# Patient Record
Sex: FEMALE | Race: ASIAN | NOT HISPANIC OR LATINO | URBAN - METROPOLITAN AREA
[De-identification: names, ages, dates, MRNs, and addresses within clinical notes are randomized per-mention and may not be internally consistent; named-entity substitution may affect disease eponyms.]

---

## 2021-02-24 ENCOUNTER — EMERGENCY (EMERGENCY)
Facility: HOSPITAL | Age: 31
LOS: 1 days | Discharge: HOME | End: 2021-02-24
Attending: EMERGENCY MEDICINE | Admitting: EMERGENCY MEDICINE
Payer: COMMERCIAL

## 2021-02-24 VITALS
HEIGHT: 65 IN | HEART RATE: 78 BPM | TEMPERATURE: 98 F | WEIGHT: 134.92 LBS | OXYGEN SATURATION: 98 % | SYSTOLIC BLOOD PRESSURE: 115 MMHG | RESPIRATION RATE: 18 BRPM | DIASTOLIC BLOOD PRESSURE: 78 MMHG

## 2021-02-24 DIAGNOSIS — M54.5 LOW BACK PAIN: ICD-10-CM

## 2021-02-24 DIAGNOSIS — M54.6 PAIN IN THORACIC SPINE: ICD-10-CM

## 2021-02-24 DIAGNOSIS — Z91.018 ALLERGY TO OTHER FOODS: ICD-10-CM

## 2021-02-24 LAB
APPEARANCE UR: CLEAR — SIGNIFICANT CHANGE UP
BACTERIA # UR AUTO: PRESENT /HPF
BILIRUB UR-MCNC: NEGATIVE — SIGNIFICANT CHANGE UP
COLOR SPEC: YELLOW — SIGNIFICANT CHANGE UP
DIFF PNL FLD: ABNORMAL
EPI CELLS # UR: SIGNIFICANT CHANGE UP /HPF (ref 0–5)
GLUCOSE UR QL: NEGATIVE — SIGNIFICANT CHANGE UP
HCG UR QL: NEGATIVE — SIGNIFICANT CHANGE UP
KETONES UR-MCNC: NEGATIVE — SIGNIFICANT CHANGE UP
LEUKOCYTE ESTERASE UR-ACNC: NEGATIVE — SIGNIFICANT CHANGE UP
NITRITE UR-MCNC: NEGATIVE — SIGNIFICANT CHANGE UP
PH UR: 6.5 — SIGNIFICANT CHANGE UP (ref 5–8)
PROT UR-MCNC: ABNORMAL MG/DL
RBC CASTS # UR COMP ASSIST: ABNORMAL /HPF
SP GR SPEC: 1.02 — SIGNIFICANT CHANGE UP (ref 1–1.03)
UROBILINOGEN FLD QL: 0.2 E.U./DL — SIGNIFICANT CHANGE UP
WBC UR QL: < 5 /HPF — SIGNIFICANT CHANGE UP

## 2021-02-24 PROCEDURE — 99284 EMERGENCY DEPT VISIT MOD MDM: CPT

## 2021-02-24 RX ORDER — LIDOCAINE 4 G/100G
1 CREAM TOPICAL
Qty: 10 | Refills: 0
Start: 2021-02-24 | End: 2021-03-05

## 2021-02-24 RX ORDER — ACETAMINOPHEN 500 MG
650 TABLET ORAL ONCE
Refills: 0 | Status: COMPLETED | OUTPATIENT
Start: 2021-02-24 | End: 2021-02-24

## 2021-02-24 RX ORDER — KETOROLAC TROMETHAMINE 30 MG/ML
15 SYRINGE (ML) INJECTION ONCE
Refills: 0 | Status: DISCONTINUED | OUTPATIENT
Start: 2021-02-24 | End: 2021-02-24

## 2021-02-24 RX ORDER — METHOCARBAMOL 500 MG/1
1 TABLET, FILM COATED ORAL
Qty: 14 | Refills: 0
Start: 2021-02-24 | End: 2021-03-02

## 2021-02-24 RX ORDER — LIDOCAINE 4 G/100G
1 CREAM TOPICAL ONCE
Refills: 0 | Status: COMPLETED | OUTPATIENT
Start: 2021-02-24 | End: 2021-02-24

## 2021-02-24 RX ADMIN — Medication 15 MILLIGRAM(S): at 21:54

## 2021-02-24 RX ADMIN — LIDOCAINE 1 PATCH: 4 CREAM TOPICAL at 21:54

## 2021-02-24 RX ADMIN — Medication 650 MILLIGRAM(S): at 21:54

## 2021-02-24 NOTE — ED ADULT NURSE NOTE - PRIMARY CARE PROVIDER
alert c/o generalized body pains and fever since yesterday   no c/o chest pain  or SOB    treated for pneumonia in jan feb and march   hx htn  acid reflux   temp 97.8 in triage
uto

## 2021-02-24 NOTE — ED PROVIDER NOTE - NSPTACCESSSVCSAPPTDETAILS_ED_ALL_ED_FT
For new R paraspinal back pain secondary to strenuous physical activity; may need better and more focused care for better spine health and disc herniation prevention

## 2021-02-24 NOTE — ED PROVIDER NOTE - PATIENT PORTAL LINK FT
You can access the FollowMyHealth Patient Portal offered by [facility] by registering at the following website: [email or web address]. By joining [employer]’s FollowMyHealth portal, you will also be able to view your health information using other applications (apps) compatible with our system.

## 2021-02-24 NOTE — ED PROVIDER NOTE - NSFOLLOWUPINSTRUCTIONS_ED_ALL_ED_FT
Acute Low Back Pain    WHAT YOU NEED TO KNOW:    Acute low back pain is sudden discomfort in your lower back area that lasts for up to 6 weeks. The discomfort makes it difficult to tolerate activity.     DISCHARGE INSTRUCTIONS:    Return to the emergency department if:   •You have severe pain.      •You have sudden stiffness and heaviness on both buttocks down to both legs.      •You have numbness or weakness in one leg, or pain in both legs.      •You have numbness in your genital area or across your lower back.      •You cannot control your urine or bowel movements.      Contact your healthcare provider if:   •You have a fever.      •You have pain at night or when you rest.      •Your pain does not get better with treatment.      •You have pain that worsens when you cough or sneeze.      •You suddenly feel something pop or snap in your back.      •You have questions or concerns about your condition or care.      Medicines: You may need any of the following:   •NSAIDs help decrease swelling and pain. This medicine is available with or without a doctor's order. NSAIDs can cause stomach bleeding or kidney problems in certain people. If you take blood thinner medicine, always ask your healthcare provider if NSAIDs are safe for you. Always read the medicine label and follow directions.      •Acetaminophen decreases pain and fever. It is available without a doctor's order. Ask how much to take and how often to take it. Follow directions. Read the labels of all other medicines you are using to see if they also contain acetaminophen, or ask your doctor or pharmacist. Acetaminophen can cause liver damage if not taken correctly. Do not use more than 4 grams (4,000 milligrams) total of acetaminophen in one day.       •Muscle relaxers decrease pain by relaxing the muscles in your lower spine.      •Prescription pain medicine may be given. Ask your healthcare provider how to take this medicine safely. Some prescription pain medicines contain acetaminophen. Do not take other medicines that contain acetaminophen without talking to your healthcare provider. Too much acetaminophen may cause liver damage. Prescription pain medicine may cause constipation. Ask your healthcare provider how to prevent or treat constipation.       Self-care:   •Stay active as much as you can without causing more pain. Bed rest could make your back pain worse. Start with some light exercises, such as walking. Avoid heavy lifting until your pain is gone. Ask for more information about the activities or exercises that are right for you.       •Apply ice on your back for 15 to 20 minutes every hour or as directed. Use an ice pack, or put crushed ice in a plastic bag. Cover it with a towel before you apply it to your skin. Ice helps prevent tissue damage and decreases swelling and pain.       •Apply heat on your back for 20 to 30 minutes every 2 hours for as many days as directed. Heat helps decrease pain and muscle spasms. Alternate heat and ice.      Prevent acute low back pain:   •Use proper body mechanics. ?Bend at the hips and knees when you  objects. Do not bend from the waist. Use your leg muscles as you lift the load. Do not use your back. Keep the object close to your chest as you lift it. Try not to twist or lift anything above your waist.      ?Change your position often when you stand for long periods of time. Rest one foot on a small box or footrest, and then switch to the other foot often.      ?Try not to sit for long periods of time. When you do, sit in a straight-backed chair with your feet flat on the floor. Never reach, pull, or push while you are sitting.      •Do exercises that strengthen your back muscles. Warm up before you exercise. Ask your healthcare provider the best exercises for you.      •Maintain a healthy weight. Ask your healthcare provider how much you should weigh. Ask him or her to help you create a weight loss plan if you are overweight.      Follow up with your healthcare provider as directed: Return for a follow-up visit if you still have pain after 1 to 3 weeks of treatment. You may need to visit an orthopedist if your back pain lasts longer than 12 weeks. Write down your questions so you remember to ask them during your visits.       © Copyright [employer] 2021           back to top                          © Copyright [employer] 2021

## 2021-02-24 NOTE — ED ADULT TRIAGE NOTE - CHIEF COMPLAINT QUOTE
walk in pt with complaints of left sided back pain x 1 week. Denies flank pain, denies muscle strain or injury. 1000mg tylenol 2 hours ago.

## 2021-02-24 NOTE — ED PROVIDER NOTE - OBJECTIVE STATEMENT
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 Denies any trauma, falls, urinary symptoms, unexplained weight loss, numbness, fever, IVDU, steroid use, or history of cancer. 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 Relieved by stretching and "back cracking" by her . Denies any trauma, falls, urinary symptoms, unexplained weight loss, numbness, fever, IVDU, steroid use, or history of cancer.

## 2021-02-24 NOTE — ED PROVIDER NOTE - CLINICAL SUMMARY MEDICAL DECISION MAKING FREE TEXT BOX
Patient currently appears well, appears nontoxic, is in NAD, and can ambulate with steady, brisk, unassisted gait in the ED. TUNAFISH negative for need for radiographic imaging and patient denies any urinary symptoms or complaints. Symptoms began after strenuous work at [employer] involving cleaning of the floors and relieved after stretching and "back cracking" by her . Otherwise ambulates well on her own with no acute injuries noted, spinal exam normal, no CVA tenderness. Patient advised to rest and cease strenuous physical activity that brought on her symptoms and to see if her symptoms improves afterwards. Also advised to follow up with physical therapy and orthopedics as outpatient for definitive care if needed. Patient currently appears well, appears nontoxic, is in NAD, and can ambulate with steady, brisk, unassisted gait in the ED. TUNAFISH negative for need for radiographic imaging and patient denies any urinary symptoms or complaints. Symptoms began after strenuous work at [employer] involving cleaning of the floors and relieved after stretching and "back cracking" by her . Otherwise ambulates well on her own with no acute injuries noted, spinal exam normal, no CVA tenderness. Patient advised to rest and cease strenuous physical activity that brought on her symptoms and to see if her symptoms improves afterwards. Also advised to follow up with physical therapy and orthopedics as outpatient for definitive care if needed. Blood in urine noted but patient reports she is on her period.

## 2021-02-26 ENCOUNTER — EMERGENCY (EMERGENCY)
Facility: HOSPITAL | Age: 31
LOS: 1 days | Discharge: HOME | End: 2021-02-26
Attending: EMERGENCY MEDICINE | Admitting: EMERGENCY MEDICINE
Payer: COMMERCIAL

## 2021-02-26 VITALS
TEMPERATURE: 98 F | OXYGEN SATURATION: 99 % | DIASTOLIC BLOOD PRESSURE: 75 MMHG | SYSTOLIC BLOOD PRESSURE: 114 MMHG | HEART RATE: 87 BPM | RESPIRATION RATE: 16 BRPM | WEIGHT: 130.07 LBS | HEIGHT: 65 IN

## 2021-02-26 DIAGNOSIS — M54.5 LOW BACK PAIN: ICD-10-CM

## 2021-02-26 LAB
APPEARANCE UR: CLEAR — SIGNIFICANT CHANGE UP
BILIRUB UR-MCNC: NEGATIVE — SIGNIFICANT CHANGE UP
COLOR SPEC: YELLOW — SIGNIFICANT CHANGE UP
DIFF PNL FLD: NEGATIVE — SIGNIFICANT CHANGE UP
GLUCOSE UR QL: NEGATIVE — SIGNIFICANT CHANGE UP
HCG UR QL: NEGATIVE — SIGNIFICANT CHANGE UP
KETONES UR-MCNC: NEGATIVE — SIGNIFICANT CHANGE UP
LEUKOCYTE ESTERASE UR-ACNC: NEGATIVE — SIGNIFICANT CHANGE UP
NITRITE UR-MCNC: NEGATIVE — SIGNIFICANT CHANGE UP
PH UR: 7.5 — SIGNIFICANT CHANGE UP (ref 5–8)
PROT UR-MCNC: NEGATIVE MG/DL — SIGNIFICANT CHANGE UP
SP GR SPEC: 1.02 — SIGNIFICANT CHANGE UP (ref 1–1.03)
UROBILINOGEN FLD QL: 0.2 E.U./DL — SIGNIFICANT CHANGE UP

## 2021-02-26 PROCEDURE — 74176 CT ABD & PELVIS W/O CONTRAST: CPT | Mod: 26

## 2021-02-26 PROCEDURE — 99284 EMERGENCY DEPT VISIT MOD MDM: CPT

## 2021-02-26 RX ORDER — KETOROLAC TROMETHAMINE 30 MG/ML
30 SYRINGE (ML) INJECTION ONCE
Refills: 0 | Status: DISCONTINUED | OUTPATIENT
Start: 2021-02-26 | End: 2021-02-26

## 2021-02-26 RX ORDER — CYCLOBENZAPRINE HYDROCHLORIDE 10 MG/1
1 TABLET, FILM COATED ORAL
Qty: 10 | Refills: 0
Start: 2021-02-26 | End: 2021-03-02

## 2021-02-26 RX ADMIN — Medication 30 MILLIGRAM(S): at 21:44

## 2021-02-26 NOTE — ED ADULT TRIAGE NOTE - CHIEF COMPLAINT QUOTE
left upper back pain x 1 week ago, seen here for same issue- given rx for pain/muscle relaxer pain worsening- denies fever,chills, trauma at this time

## 2021-02-26 NOTE — ED PROVIDER NOTE - PATIENT PORTAL LINK FT
You can access the FollowMyHealth Patient Portal offered by [facility] by registering at the following website: [email or web address]. By joining [employer]’s FollowMyHealth portal, you will also be able to view your health information using other applications (apps) compatible with our system.

## 2021-02-26 NOTE — ED PROVIDER NOTE - INTERNATIONAL TRAVEL
Pt left with out being seen by nurse or md. Searched for pt in assigned area and through halls. Physician called pt with no answer. Checked for pt in waiting room. Md aware/no No

## 2021-02-26 NOTE — ED PROVIDER NOTE - NS ED ROS FT
Constitutional:  No fever, No chills, No night sweats  Eyes:  No visual changes, No discharge, No redness  ENMT:  No epistaxis, no nasal congestion, no throat pain, no difficulty swallowing  CV:  No chest pain, No palpitations, No peripheral edema  Resp:  No cough, No shortness of breath  GI:  No abdominal pain, No vomiting, No diarrhea  MSK:  No neck pain or stiffness, No joint swelling or pain, +back pain  Neuro: no loss of consciousness, no gait abnormality, no headache, no sensory deficits, and no weakness.  Skin:  No abrasions, no lesions, no rashes  Psych:  No known mental health issues

## 2021-02-26 NOTE — ED PROVIDER NOTE - CLINICAL SUMMARY MEDICAL DECISION MAKING FREE TEXT BOX
31yo F no pmh presents with intermittent L flank pain x1wk, only occasionally related to positional changes.  On exam afebrile, VSS, well appearing, no midline spinal TTP, no CVA TTP.  Likely MSK strain; will check CT KUB to eval for stone, UA to eval for hematuria/ UTI, treat pain, monitor, reassess.  No signs or symptoms to suggest cord compression at this time.

## 2021-02-26 NOTE — ED ADULT NURSE NOTE - NSIMPLEMENTINTERV_GEN_ALL_ED
Implemented All Universal Safety Interventions:  Anselmo to call system. Call bell, personal items and telephone within reach. Instruct patient to call for assistance. Room bathroom lighting operational. Non-slip footwear when patient is off stretcher. Physically safe environment: no spills, clutter or unnecessary equipment. Stretcher in lowest position, wheels locked, appropriate side rails in place.

## 2021-02-26 NOTE — ED ADULT NURSE NOTE - OBJECTIVE STATEMENT
30y female presents to ED c/o left flank pain. Pt seen here recently for same complaint, states she has not noticed any improvement in symptoms. Ambulatory. Denies urinary symptoms, PMH. A&ox4.

## 2021-02-26 NOTE — ED PROVIDER NOTE - PHYSICAL EXAMINATION
Constitutional:  Well appearing, awake, alert, oriented to person, place, time/situation and in no apparent distress.  HEENT: Airway patent, Nasal mucosa clear. Mouth with normal mucosa. Throat has no vesicles, no oropharyngeal exudates and uvula is midline.  Eyes: Clear bilaterally, pupils equal, round and reactive to light.  Cardiac: Normal rate, regular rhythm.  Respiratory: Breath sounds clear and equal bilaterally.  GI: Abdomen soft, non-tender, no guarding.  MSK: Spine appears normal, range of motion is not limited, no muscle or joint tenderness  Neuro: Alert and oriented, no focal deficits, no motor or sensory deficits.  Skin: Skin normal color for race, warm, dry and intact. No evidence of rash.

## 2021-02-26 NOTE — ED PROVIDER NOTE - NSFOLLOWUPINSTRUCTIONS_ED_ALL_ED_FT
Back Pain    WHAT YOU NEED TO KNOW:    Back pain is common. You may have back pain and muscle spasms. You may feel sore or stiff on one or both sides of your back. The pain may spread to your lower body. Conditions that affect the spine, joints, or muscles can cause back pain. These may include arthritis, spinal stenosis (narrowing of the spinal column), muscle tension, or breakdown of the spinal discs.    DISCHARGE INSTRUCTIONS:    Call your local emergency number (911 in the ) if:   •You have severe back pain with chest pain.      •You cannot control your urine or bowel movements.      •Your pain becomes so severe that you cannot walk.      Return to the emergency department if:   •You have pain, numbness, or weakness in one or both legs.      •You have severe back pain, nausea, and vomiting.      •You have severe back pain that spreads to your side or genital area.      Call your doctor if:   •You have back pain that does not get better with rest and pain medicine.      •You have a fever.      •You have pain that worsens when you are on your back or when you rest.      •You have pain that worsens when you cough or sneeze.      •You lose weight without trying.      •You have questions or concerns about your condition or care.      Medicines: You may need any of the following:   •NSAIDs, such as ibuprofen, help decrease swelling, pain, and fever. This medicine is available with or without a doctor's order. NSAIDs can cause stomach bleeding or kidney problems in certain people. If you take blood thinner medicine, always ask your healthcare provider if NSAIDs are safe for you. Always read the medicine label and follow directions.      •Acetaminophen decreases pain and fever. It is available without a doctor's order. Ask how much to take and how often to take it. Follow directions. Read the labels of all other medicines you are using to see if they also contain acetaminophen, or ask your doctor or pharmacist. Acetaminophen can cause liver damage if not taken correctly. Do not use more than 4 grams (4,000 milligrams) total of acetaminophen in one day.       •Muscle relaxers help decrease muscle spasms and back pain.      •Prescription pain medicine may be given. Ask your healthcare provider how to take this medicine safely. Some prescription pain medicines contain acetaminophen. Do not take other medicines that contain acetaminophen without talking to your healthcare provider. Too much acetaminophen may cause liver damage. Prescription pain medicine may cause constipation. Ask your healthcare provider how to prevent or treat constipation.       •Take your medicine as directed. Contact your healthcare provider if you think your medicine is not helping or if you have side effects. Tell him or her if you are allergic to any medicine. Keep a list of the medicines, vitamins, and herbs you take. Include the amounts, and when and why you take them. Bring the list or the pill bottles to follow-up visits. Carry your medicine list with you in case of an emergency.      How to manage your back pain:   •Apply ice on your back for 15 to 20 minutes every hour or as directed. Use an ice pack, or put crushed ice in a plastic bag. Cover it with a towel before you apply it to your skin. Ice helps prevent tissue damage and decreases pain.      •Apply heat on your back for 20 to 30 minutes every 2 hours for as many days as directed. Heat helps decrease pain and muscle spasms.      •Stay active as much as you can without causing more pain. Bed rest could make your back pain worse. Avoid heavy lifting until your pain is gone.      •Go to physical therapy as directed. A physical therapist can teach you exercises to help improve movement and strength, and to decrease pain.      Follow up with your doctor in 2 weeks, or as directed: You might need to see a specialist. Write down your questions so you remember to ask them during your visits. Back Pain    WHAT YOU NEED TO KNOW:    Back pain is common. You may have back pain and muscle spasms. You may feel sore or stiff on one or both sides of your back. The pain may spread to your lower body. Conditions that affect the spine, joints, or muscles can cause back pain. These may include arthritis, spinal stenosis (narrowing of the spinal column), muscle tension, or breakdown of the spinal discs.    DISCHARGE INSTRUCTIONS:    Call your local emergency number (911 in the ) if:   •You have severe back pain with chest pain.      •You cannot control your urine or bowel movements.      •Your pain becomes so severe that you cannot walk.      Return to the emergency department if:   •You have pain, numbness, or weakness in one or both legs.      •You have severe back pain, nausea, and vomiting.      •You have severe back pain that spreads to your side or genital area.      Call your doctor if:   •You have back pain that does not get better with rest and pain medicine.      •You have a fever.      •You have pain that worsens when you are on your back or when you rest.      •You have pain that worsens when you cough or sneeze.      •You lose weight without trying.      •You have questions or concerns about your condition or care.      Medicines: You may need any of the following:   •NSAIDs, such as ibuprofen, help decrease swelling, pain, and fever. This medicine is available with or without a doctor's order. NSAIDs can cause stomach bleeding or kidney problems in certain people. If you take blood thinner medicine, always ask your healthcare provider if NSAIDs are safe for you. Always read the medicine label and follow directions.      •Acetaminophen decreases pain and fever. It is available without a doctor's order. Ask how much to take and how often to take it. Follow directions. Read the labels of all other medicines you are using to see if they also contain acetaminophen, or ask your doctor or pharmacist. Acetaminophen can cause liver damage if not taken correctly. Do not use more than 4 grams (4,000 milligrams) total of acetaminophen in one day.       •Muscle relaxers help decrease muscle spasms and back pain.      •Prescription pain medicine may be given. Ask your healthcare provider how to take this medicine safely. Some prescription pain medicines contain acetaminophen. Do not take other medicines that contain acetaminophen without talking to your healthcare provider. Too much acetaminophen may cause liver damage. Prescription pain medicine may cause constipation. Ask your healthcare provider how to prevent or treat constipation.       •Take your medicine as directed. Contact your healthcare provider if you think your medicine is not helping or if you have side effects. Tell him or her if you are allergic to any medicine. Keep a list of the medicines, vitamins, and herbs you take. Include the amounts, and when and why you take them. Bring the list or the pill bottles to follow-up visits. Carry your medicine list with you in case of an emergency.      How to manage your back pain:   •Apply ice on your back for 15 to 20 minutes every hour or as directed. Use an ice pack, or put crushed ice in a plastic bag. Cover it with a towel before you apply it to your skin. Ice helps prevent tissue damage and decreases pain.      •Apply heat on your back for 20 to 30 minutes every 2 hours for as many days as directed. Heat helps decrease pain and muscle spasms.      •Stay active as much as you can without causing more pain. Bed rest could make your back pain worse. Avoid heavy lifting until your pain is gone.      •Go to physical therapy as directed. A physical therapist can teach you exercises to help improve movement and strength, and to decrease pain.      Follow up with your doctor in 2 weeks, or as directed: You might need to see a specialist. Write down your questions so you remember to ask them during your visits.        Lower Back Exercises    AMBULATORY CARE:    Lower back exercises help heal and strengthen your back muscles to prevent another injury. Ask your healthcare provider if you need to see a physical therapist for more advanced exercises.    Seek care immediately if:   •You have severe pain that prevents you from moving.           Contact your healthcare provider if:   •Your pain becomes worse.      •You have new pain.      •You have questions or concerns about your condition or care.      Do lower back exercises safely:   •Do the exercises on a mat or firm surface (not on a bed) to support your spine and prevent low back pain.       •Move slowly and smoothly. Avoid fast or jerky motions.       •Breathe normally. Do not hold your breath.       •Stop if you feel pain. It is normal to feel some discomfort at first. Regular exercise will help decrease your discomfort over time.      Lower back exercises: Your healthcare provider may recommend that you do back exercises 10 to 30 minutes each day. He may also recommend that you do exercises 1 to 3 times each day. Ask your healthcare provider which exercises are best for you and how often to do them.  •Ankle pumps: Lie on your back. Move your foot up (with your toes pointing toward your head). Then, move your foot down (with your toes pointing away from you). Repeat this exercise 10 times on each side.  Ankle Pumps           •Heel slides: Lie on your back. Slowly bend one leg and then straighten it. Next, bend the other leg and then straighten it. Repeat 10 times on each side.   Heel Slides           •Pelvic tilt: Lie on your back with your knees bent and feet flat on the floor. Place your arms in a relaxed position beside your body. Tighten the muscles of your abdomen and flatten your back against the floor. Hold for 5 seconds. Repeat 5 times.  Pelvic Tilt           •Back stretch: Lie on your back with your hands behind your head. Bend your knees and turn the lower half of your body to one side. Hold this position for 10 seconds. Repeat 3 times on each side.   Back Stretch           •Straight leg raises: Lie on your back with one leg straight. Bend the other knee. Tighten your abdomen and then slowly lift the straight leg up about 6 to 12 inches off the floor. Hold for 1 to 5 seconds. Lower your leg slowly. Repeat 10 times on each leg.  Straight Leg Raises           •Knee-to-chest: Lie on your back with your knees bent and feet flat on the floor. Pull one of your knees toward your chest and hold it there for 5 seconds. Return your leg to the starting position. Lift the other knee toward your chest and hold for 5 seconds. Do this 5 times on each side.   Knee to Chest           •Cat and camel: Place your hands and knees on the floor. Arch your back upward toward the ceiling and lower your head. Round out your spine as much as you can. Hold for 5 seconds. Lift your head upward and push your chest downward toward the floor. Hold for 5 seconds. Do 3 sets or as directed.  Cat and Camel Exercise           •Wall squats: Stand with your back against a wall. Tighten the muscles of your abdomen. Slowly lower your body until your knees are bent at a 45 degree angle. Hold this position for 5 seconds. Slowly move back up to a standing position. Repeat 10 times.  STANDING HALF SQUATS           •Curl up: Lie on your back with your knees bent and feet flat on the floor. Place your hands, palms down, underneath the curve in your lower back. Next, with your elbows on the floor, lift your shoulders and chest 2 to 3 inches. Keep your head in line with your shoulders. Hold this position for 5 seconds. When you can do this exercise without pain for 10 to 15 seconds, you may add a rotation. While your shoulders and chest are lifted off the ground, turn slightly to the left and hold. Repeat on the other side.  Abdominal Curl Up           •Bird dog: Place your hands and knees on the floor. Keep your wrists directly below your shoulders and your knees directly below your hips. Pull your belly button in toward your spine. Do not flatten or arch your back. Tighten your abdominal muscles. Raise one arm straight out so that it is aligned with your head. Next, raise the leg opposite your arm. Hold this position for 15 seconds. Lower your arm and leg slowly and change sides. Do 5 sets.   Bird Dog Exercise

## 2021-02-26 NOTE — ED PROVIDER NOTE - OBJECTIVE STATEMENT
29yo F no pmh presents with worsening intermittent nonradiating L flank pain x1 week, worse with walking and partially relieved by lying down.  Seen here 2d ago for same, noted to have hematuria, but was on her period at the time.  Was given analgesia for MSK back pain, dc'ed w analgesia and muscle relaxants.  Pt states she has been taking appropriate analgesia without relief.  No trauma to back.  No leg weakness.  No bowel or bladder dysfunction.  No abd pain.  No hx of kidney stone.  No dysuria or hematuria.  No fever, chills, nausea, vomiting, or diarrhea.

## 2021-02-26 NOTE — ED PROVIDER NOTE - PROGRESS NOTE DETAILS
CT negative for stone.  UA negative.  Pt feels better, wants to go home.  States she took some of her father in law's flexeril which was helpful.  Requesting flexeril rx.  Will give short course flexeril.  Return precautions discussed.  Stable for dc.

## 2021-02-27 PROBLEM — Z78.9 OTHER SPECIFIED HEALTH STATUS: Chronic | Status: ACTIVE | Noted: 2021-02-24

## 2022-08-23 PROBLEM — Z00.00 ENCOUNTER FOR PREVENTIVE HEALTH EXAMINATION: Status: ACTIVE | Noted: 2022-08-23

## 2022-08-29 ENCOUNTER — APPOINTMENT (OUTPATIENT)
Dept: OBGYN | Facility: CLINIC | Age: 32
End: 2022-08-29

## 2022-12-31 ENCOUNTER — NON-APPOINTMENT (OUTPATIENT)
Age: 32
End: 2022-12-31

## 2023-01-10 ENCOUNTER — APPOINTMENT (OUTPATIENT)
Dept: DERMATOLOGY | Facility: CLINIC | Age: 33
End: 2023-01-10

## 2023-01-27 ENCOUNTER — APPOINTMENT (OUTPATIENT)
Dept: DERMATOLOGY | Facility: CLINIC | Age: 33
End: 2023-01-27
Payer: COMMERCIAL

## 2023-01-27 DIAGNOSIS — L30.1 DYSHIDROSIS [POMPHOLYX]: ICD-10-CM

## 2023-01-27 PROCEDURE — 99204 OFFICE O/P NEW MOD 45 MIN: CPT

## 2023-01-27 RX ORDER — HALOBETASOL PROPIONATE 0.5 MG/G
0.05 OINTMENT TOPICAL
Qty: 1 | Refills: 1 | Status: ACTIVE | COMMUNITY
Start: 2023-01-27 | End: 1900-01-01

## 2023-04-14 ENCOUNTER — APPOINTMENT (OUTPATIENT)
Dept: DERMATOLOGY | Facility: CLINIC | Age: 33
End: 2023-04-14

## 2023-05-16 ENCOUNTER — APPOINTMENT (OUTPATIENT)
Dept: DERMATOLOGY | Facility: CLINIC | Age: 33
End: 2023-05-16
Payer: COMMERCIAL

## 2023-05-16 DIAGNOSIS — L98.9 DISORDER OF THE SKIN AND SUBCUTANEOUS TISSUE, UNSPECIFIED: ICD-10-CM

## 2023-05-16 DIAGNOSIS — L72.9 FOLLICULAR CYST OF THE SKIN AND SUBCUTANEOUS TISSUE, UNSPECIFIED: ICD-10-CM

## 2023-05-16 PROCEDURE — 99214 OFFICE O/P EST MOD 30 MIN: CPT

## 2023-05-16 RX ORDER — MUPIROCIN 20 MG/G
2 OINTMENT TOPICAL
Qty: 1 | Refills: 1 | Status: ACTIVE | COMMUNITY
Start: 2023-05-16 | End: 1900-01-01

## 2023-09-11 ENCOUNTER — OUTPATIENT (OUTPATIENT)
Dept: INPATIENT UNIT | Facility: HOSPITAL | Age: 33
LOS: 1 days | Discharge: HOME | End: 2023-09-11
Payer: COMMERCIAL

## 2023-09-11 VITALS
HEART RATE: 100 BPM | SYSTOLIC BLOOD PRESSURE: 100 MMHG | DIASTOLIC BLOOD PRESSURE: 69 MMHG | RESPIRATION RATE: 16 BRPM | TEMPERATURE: 98 F

## 2023-09-11 VITALS — DIASTOLIC BLOOD PRESSURE: 68 MMHG | SYSTOLIC BLOOD PRESSURE: 114 MMHG | HEART RATE: 88 BPM

## 2023-09-11 DIAGNOSIS — Z98.890 OTHER SPECIFIED POSTPROCEDURAL STATES: Chronic | ICD-10-CM

## 2023-09-11 DIAGNOSIS — O26.899 OTHER SPECIFIED PREGNANCY RELATED CONDITIONS, UNSPECIFIED TRIMESTER: ICD-10-CM

## 2023-09-11 PROCEDURE — 59025 FETAL NON-STRESS TEST: CPT | Mod: 26

## 2023-09-11 PROCEDURE — 99221 1ST HOSP IP/OBS SF/LOW 40: CPT | Mod: 25

## 2023-09-11 NOTE — OB PROVIDER TRIAGE NOTE - NSOBPROVIDERNOTE_OBGYN_ALL_OB_FT
32y  at 38w6d, no evidence of bleeding at present  No evidence of labor at this  NST done reactive  BPP done   D/w Dr Roque  Discharge home with instructions  labor precautions  Pt to monitor fetal kick counts  Pt to follow up with OB as scheduled  Pt to return to triage if symptoms worsened: heavy vaginal bleeding, strong uterine contractions, LOF or decreased fetal movements.  - Patient discharged at 20:15

## 2023-09-11 NOTE — OB PROVIDER TRIAGE NOTE - PLAN OF CARE
D/w Dr Roque  Discharge home with instructions  labor precautions  Pt to monitor fetal kick counts  Pt to follow up with OB as scheduled  Pt to return to triage if symptoms worsened: heavy vaginal bleeding, strong uterine contractions, LOF or decreased fetal movements.

## 2023-09-11 NOTE — OB PROVIDER TRIAGE NOTE - NSHPPHYSICALEXAM_GEN_ALL_CORE
T(C): 36.9 (09-11-23 @ 19:24), Max: 36.9 (09-11-23 @ 16:15)  HR: 88 (09-11-23 @ 20:12) (88 - 100)  BP: 107/69 (09-11-23 @ 19:26) (100/69 - 107/69)  RR: 16 (09-11-23 @ 16:15) (16 - 16)    Heart: RRR  Lungs: CTA  Abdomen: Gravid, soft, NT    NST: Reactive with moderate variability  [place]: No contractions  VE: long/closed/post/high; no blood seen.  TAS: SLIUP, cephalic, Anterior placenta, YASMIN: 11.58, BPP 8/8    Images saved in [facility]

## 2023-09-11 NOTE — OB RN TRIAGE NOTE - NS_OBACUITY_OBGYN_ALL_OB_DT
Dotony Mac !     Pat Dubose, DO
Jyothi Perales is calling to inform you that Alexis Prabhakar is not compliant with medications. They have called her with no answer and no voice mail.
11-Sep-2023 14:02

## 2023-09-11 NOTE — OB PROVIDER TRIAGE NOTE - HISTORY OF PRESENT ILLNESS
32y  at 38w6d presents to triage c/o spotting once today when she wiped after urinating.  Reports +FM, no vaginal bleeding, no ROM or LOF  Prenatal care: Dr López, denies any prenatal complications 32y  at 38w6d presents to triage c/o spotting once today when she wiped after urinating.  Reports +FM, no vaginal bleeding, no ROM or LOF  Prenatal care: Dr López, denies any prenatal complications

## 2023-09-13 DIAGNOSIS — O26.853 SPOTTING COMPLICATING PREGNANCY, THIRD TRIMESTER: ICD-10-CM

## 2023-09-13 DIAGNOSIS — Z3A.38 38 WEEKS GESTATION OF PREGNANCY: ICD-10-CM

## 2023-09-22 ENCOUNTER — INPATIENT (INPATIENT)
Facility: HOSPITAL | Age: 33
LOS: 2 days | Discharge: HOME | End: 2023-09-25
Attending: SPECIALIST | Admitting: SPECIALIST

## 2023-09-22 DIAGNOSIS — Z98.890 OTHER SPECIFIED POSTPROCEDURAL STATES: Chronic | ICD-10-CM

## 2023-09-22 DIAGNOSIS — O26.619 LIVER AND BILIARY TRACT DISORDERS IN PREGNANCY, UNSPECIFIED TRIMESTER: ICD-10-CM

## 2023-09-22 LAB
BASOPHILS # BLD AUTO: 0.02 K/UL — SIGNIFICANT CHANGE UP (ref 0–0.2)
BASOPHILS NFR BLD AUTO: 0.2 % — SIGNIFICANT CHANGE UP (ref 0–2)
BLD GP AB SCN SERPL QL: NEGATIVE — SIGNIFICANT CHANGE UP
EOSINOPHIL # BLD AUTO: 0.24 K/UL — SIGNIFICANT CHANGE UP (ref 0–0.5)
EOSINOPHIL NFR BLD AUTO: 2.5 % — SIGNIFICANT CHANGE UP (ref 0–6)
HCT VFR BLD CALC: 38.5 % — SIGNIFICANT CHANGE UP (ref 34.5–45)
HGB BLD-MCNC: 12.8 G/DL — SIGNIFICANT CHANGE UP (ref 11.5–15.5)
IANC: 6.95 K/UL — SIGNIFICANT CHANGE UP (ref 1.8–7.4)
IMM GRANULOCYTES NFR BLD AUTO: 0.5 % — SIGNIFICANT CHANGE UP (ref 0–0.9)
LYMPHOCYTES # BLD AUTO: 1.69 K/UL — SIGNIFICANT CHANGE UP (ref 1–3.3)
LYMPHOCYTES # BLD AUTO: 17.7 % — SIGNIFICANT CHANGE UP (ref 13–44)
MCHC RBC-ENTMCNC: 26.7 PG — LOW (ref 27–34)
MCHC RBC-ENTMCNC: 33.2 GM/DL — SIGNIFICANT CHANGE UP (ref 32–36)
MCV RBC AUTO: 80.2 FL — SIGNIFICANT CHANGE UP (ref 80–100)
MONOCYTES # BLD AUTO: 0.62 K/UL — SIGNIFICANT CHANGE UP (ref 0–0.9)
MONOCYTES NFR BLD AUTO: 6.5 % — SIGNIFICANT CHANGE UP (ref 2–14)
NEUTROPHILS # BLD AUTO: 6.95 K/UL — SIGNIFICANT CHANGE UP (ref 1.8–7.4)
NEUTROPHILS NFR BLD AUTO: 72.6 % — SIGNIFICANT CHANGE UP (ref 43–77)
NRBC # BLD: 0 /100 WBCS — SIGNIFICANT CHANGE UP (ref 0–0)
NRBC # FLD: 0 K/UL — SIGNIFICANT CHANGE UP (ref 0–0)
PLATELET # BLD AUTO: 129 K/UL — LOW (ref 150–400)
RBC # BLD: 4.8 M/UL — SIGNIFICANT CHANGE UP (ref 3.8–5.2)
RBC # FLD: 14.2 % — SIGNIFICANT CHANGE UP (ref 10.3–14.5)
RH IG SCN BLD-IMP: POSITIVE — SIGNIFICANT CHANGE UP
RH IG SCN BLD-IMP: POSITIVE — SIGNIFICANT CHANGE UP
T PALLIDUM AB TITR SER: NEGATIVE — SIGNIFICANT CHANGE UP
WBC # BLD: 9.57 K/UL — SIGNIFICANT CHANGE UP (ref 3.8–10.5)
WBC # FLD AUTO: 9.57 K/UL — SIGNIFICANT CHANGE UP (ref 3.8–10.5)

## 2023-09-22 RX ORDER — SODIUM CHLORIDE 9 MG/ML
1000 INJECTION, SOLUTION INTRAVENOUS ONCE
Refills: 0 | Status: DISCONTINUED | OUTPATIENT
Start: 2023-09-22 | End: 2023-09-24

## 2023-09-22 RX ORDER — SODIUM CHLORIDE 9 MG/ML
500 INJECTION, SOLUTION INTRAVENOUS ONCE
Refills: 0 | Status: DISCONTINUED | OUTPATIENT
Start: 2023-09-22 | End: 2023-09-24

## 2023-09-22 RX ORDER — SODIUM CHLORIDE 9 MG/ML
1000 INJECTION, SOLUTION INTRAVENOUS
Refills: 0 | Status: DISCONTINUED | OUTPATIENT
Start: 2023-09-22 | End: 2023-09-24

## 2023-09-22 RX ORDER — INFLUENZA VIRUS VACCINE 15; 15; 15; 15 UG/.5ML; UG/.5ML; UG/.5ML; UG/.5ML
0.5 SUSPENSION INTRAMUSCULAR ONCE
Refills: 0 | Status: DISCONTINUED | OUTPATIENT
Start: 2023-09-22 | End: 2023-09-25

## 2023-09-22 RX ORDER — CITRIC ACID/SODIUM CITRATE 300-500 MG
15 SOLUTION, ORAL ORAL EVERY 6 HOURS
Refills: 0 | Status: DISCONTINUED | OUTPATIENT
Start: 2023-09-22 | End: 2023-09-24

## 2023-09-22 RX ORDER — OXYTOCIN 10 UNIT/ML
333.33 VIAL (ML) INJECTION
Qty: 20 | Refills: 0 | Status: COMPLETED | OUTPATIENT
Start: 2023-09-22 | End: 2023-09-22

## 2023-09-22 RX ORDER — CHLORHEXIDINE GLUCONATE 213 G/1000ML
1 SOLUTION TOPICAL DAILY
Refills: 0 | Status: DISCONTINUED | OUTPATIENT
Start: 2023-09-22 | End: 2023-09-24

## 2023-09-22 RX ADMIN — SODIUM CHLORIDE 125 MILLILITER(S): 9 INJECTION, SOLUTION INTRAVENOUS at 12:20

## 2023-09-22 RX ADMIN — CHLORHEXIDINE GLUCONATE 1 APPLICATION(S): 213 SOLUTION TOPICAL at 13:14

## 2023-09-22 RX ADMIN — SODIUM CHLORIDE 125 MILLILITER(S): 9 INJECTION, SOLUTION INTRAVENOUS at 22:46

## 2023-09-22 NOTE — OB RN PATIENT PROFILE - NS PRO DEPRESSION SCREENING Y/N6
From: Yi Mari  To: Jose Maria Alfaro  Sent: 2/4/2021 12:48 PM CST  Subject: Medication Question    my insurance needs a pre auterization for the vyvance. if i dont take it i get really sleepy and its for me to do anything  
no

## 2023-09-22 NOTE — OB PROVIDER H&P - HISTORY OF PRESENT ILLNESS
R1 Admission H&P    Subjective  HPI: 32y  @40w3d presents for elective induction. Per patient initial concern for ICP as patient was having full body itches and patient was scheduled for induction since she was >40w, however bile acids were drawn and 3.7.     +FM. -LOF. -CTXs. -VB. Pt denies any other concerns.    – PNC: Denies prenatal issues. GBS neg. EFW 4000g by sono on  per patient.  – OBHx: Primigravid  – GynHx: denies fibroids, cysts, endometriosis, abnormal pap smears, STIs  – PMH: denies  – PSH: polypectomy ()  – Psych: denies   – Social: denies   – Meds: PNV   – Allergies: NKDA  – Will accept blood transfusions? Yes

## 2023-09-22 NOTE — OB PROVIDER H&P - ASSESSMENT
Assessment   32y  @40w3d presents for elective induction.    Plan  1. Admit to L+D. Routine Labs. IVF.  2. IOL w/ buccal cytotec and cervical balloon  3. Fetus: cat 1 tracing. VTX. EFW 4000g by sono. Continuous EFM/[place]  4. Prenatal issues: none  5. GBS negative  6. Pain: IV pain meds/epidural PRN    Plan per attending physician, Dr. Maribel Bauman PGY-1

## 2023-09-22 NOTE — OB RN PATIENT PROFILE - FALL HARM RISK - UNIVERSAL INTERVENTIONS
Bed in lowest position, wheels locked, appropriate side rails in place/Call bell, personal items and telephone in reach/Instruct patient to call for assistance before getting out of bed or chair/Non-slip footwear when patient is out of bed/[place] to call system/Physically safe environment - no spills, clutter or unnecessary equipment/Purposeful Proactive Rounding/Room/bathroom lighting operational, light cord in reach

## 2023-09-22 NOTE — OB PROVIDER H&P - NSHPPHYSICALEXAM_GEN_ALL_CORE
Objective  – VS  T(C): 36.7 (09-22-23 @ 11:15)  HR: 78 (09-22-23 @ 11:41)  BP: 112/63 (09-22-23 @ 11:41)  RR: 16 (09-22-23 @ 11:15)  SpO2: 97% (09-22-23 @ 11:15)    Physical Exam  CV: RRR  Pulm: breathing comfortably on RA  Abd: gravid, nontender  Extr: moving all extremities with ease  – VE: 0/0/-3  – FHT: baseline 130, mod variability, +accels, -decels  – [place]: uterine irritability  – EFW: 4000g by sono  – Sono: vertex

## 2023-09-22 NOTE — OB PROVIDER H&P - NSLOWPPHRISK_OBGYN_A_OB
No previous uterine incision/Davies Pregnancy/Less than or equal to 4 previous vaginal births/No known bleeding disorder/No history of postpartum hemorrhage

## 2023-09-23 LAB
HCT VFR BLD CALC: 38 % — SIGNIFICANT CHANGE UP (ref 34.5–45)
HGB BLD-MCNC: 12.3 G/DL — SIGNIFICANT CHANGE UP (ref 11.5–15.5)
MCHC RBC-ENTMCNC: 26.3 PG — LOW (ref 27–34)
MCHC RBC-ENTMCNC: 32.4 GM/DL — SIGNIFICANT CHANGE UP (ref 32–36)
MCV RBC AUTO: 81.4 FL — SIGNIFICANT CHANGE UP (ref 80–100)
NRBC # BLD: 0 /100 WBCS — SIGNIFICANT CHANGE UP (ref 0–0)
NRBC # FLD: 0 K/UL — SIGNIFICANT CHANGE UP (ref 0–0)
PLATELET # BLD AUTO: 120 K/UL — LOW (ref 150–400)
RBC # BLD: 4.67 M/UL — SIGNIFICANT CHANGE UP (ref 3.8–5.2)
RBC # FLD: 14.5 % — SIGNIFICANT CHANGE UP (ref 10.3–14.5)
WBC # BLD: 9.98 K/UL — SIGNIFICANT CHANGE UP (ref 3.8–10.5)
WBC # FLD AUTO: 9.98 K/UL — SIGNIFICANT CHANGE UP (ref 3.8–10.5)

## 2023-09-23 RX ORDER — OXYTOCIN 10 UNIT/ML
2 VIAL (ML) INJECTION
Qty: 30 | Refills: 0 | Status: DISCONTINUED | OUTPATIENT
Start: 2023-09-23 | End: 2023-09-24

## 2023-09-23 RX ADMIN — Medication 2 MILLIUNIT(S)/MIN: at 20:05

## 2023-09-23 RX ADMIN — CHLORHEXIDINE GLUCONATE 1 APPLICATION(S): 213 SOLUTION TOPICAL at 11:07

## 2023-09-23 RX ADMIN — SODIUM CHLORIDE 125 MILLILITER(S): 9 INJECTION, SOLUTION INTRAVENOUS at 09:00

## 2023-09-23 NOTE — CHART NOTE - NSCHARTNOTEFT_GEN_A_CORE
Patient for rr-IOL.   No complaints.   EFM- Reactive, good variability.   Contractions irregular.  PE: 4 cm floppy & posterior/70/-3  Vaginal Cytotec placed.   D/W Patient, all questions answered.   Plan : Continue with present management.

## 2023-09-24 ENCOUNTER — TRANSCRIPTION ENCOUNTER (OUTPATIENT)
Age: 33
End: 2023-09-24

## 2023-09-24 RX ORDER — OXYCODONE HYDROCHLORIDE 5 MG/1
5 TABLET ORAL
Refills: 0 | Status: DISCONTINUED | OUTPATIENT
Start: 2023-09-24 | End: 2023-09-25

## 2023-09-24 RX ORDER — KETOROLAC TROMETHAMINE 30 MG/ML
30 SYRINGE (ML) INJECTION ONCE
Refills: 0 | Status: DISCONTINUED | OUTPATIENT
Start: 2023-09-24 | End: 2023-09-25

## 2023-09-24 RX ORDER — DIPHENHYDRAMINE HCL 50 MG
25 CAPSULE ORAL EVERY 6 HOURS
Refills: 0 | Status: DISCONTINUED | OUTPATIENT
Start: 2023-09-24 | End: 2023-09-25

## 2023-09-24 RX ORDER — AER TRAVELER 0.5 G/1
1 SOLUTION RECTAL; TOPICAL EVERY 4 HOURS
Refills: 0 | Status: DISCONTINUED | OUTPATIENT
Start: 2023-09-24 | End: 2023-09-25

## 2023-09-24 RX ORDER — IBUPROFEN 200 MG
600 TABLET ORAL EVERY 6 HOURS
Refills: 0 | Status: DISCONTINUED | OUTPATIENT
Start: 2023-09-24 | End: 2023-09-25

## 2023-09-24 RX ORDER — OXYCODONE HYDROCHLORIDE 5 MG/1
5 TABLET ORAL ONCE
Refills: 0 | Status: DISCONTINUED | OUTPATIENT
Start: 2023-09-24 | End: 2023-09-25

## 2023-09-24 RX ORDER — LANOLIN
1 OINTMENT (GRAM) TOPICAL EVERY 6 HOURS
Refills: 0 | Status: DISCONTINUED | OUTPATIENT
Start: 2023-09-24 | End: 2023-09-25

## 2023-09-24 RX ORDER — ACETAMINOPHEN 500 MG
975 TABLET ORAL
Refills: 0 | Status: DISCONTINUED | OUTPATIENT
Start: 2023-09-24 | End: 2023-09-25

## 2023-09-24 RX ORDER — DIBUCAINE 1 %
1 OINTMENT (GRAM) RECTAL EVERY 6 HOURS
Refills: 0 | Status: DISCONTINUED | OUTPATIENT
Start: 2023-09-24 | End: 2023-09-25

## 2023-09-24 RX ORDER — MAGNESIUM HYDROXIDE 400 MG/1
30 TABLET, CHEWABLE ORAL
Refills: 0 | Status: DISCONTINUED | OUTPATIENT
Start: 2023-09-24 | End: 2023-09-25

## 2023-09-24 RX ORDER — HYDROCORTISONE 1 %
1 OINTMENT (GRAM) TOPICAL EVERY 6 HOURS
Refills: 0 | Status: DISCONTINUED | OUTPATIENT
Start: 2023-09-24 | End: 2023-09-25

## 2023-09-24 RX ORDER — TETANUS TOXOID, REDUCED DIPHTHERIA TOXOID AND ACELLULAR PERTUSSIS VACCINE, ADSORBED 5; 2.5; 8; 8; 2.5 [IU]/.5ML; [IU]/.5ML; UG/.5ML; UG/.5ML; UG/.5ML
0.5 SUSPENSION INTRAMUSCULAR ONCE
Refills: 0 | Status: DISCONTINUED | OUTPATIENT
Start: 2023-09-24 | End: 2023-09-25

## 2023-09-24 RX ORDER — BENZOCAINE 10 %
1 GEL (GRAM) MUCOUS MEMBRANE EVERY 6 HOURS
Refills: 0 | Status: DISCONTINUED | OUTPATIENT
Start: 2023-09-24 | End: 2023-09-25

## 2023-09-24 RX ORDER — SIMETHICONE 80 MG/1
80 TABLET, CHEWABLE ORAL EVERY 4 HOURS
Refills: 0 | Status: DISCONTINUED | OUTPATIENT
Start: 2023-09-24 | End: 2023-09-25

## 2023-09-24 RX ORDER — IBUPROFEN 200 MG
600 TABLET ORAL EVERY 6 HOURS
Refills: 0 | Status: COMPLETED | OUTPATIENT
Start: 2023-09-24 | End: 2024-08-22

## 2023-09-24 RX ORDER — PRAMOXINE HYDROCHLORIDE 150 MG/15G
1 AEROSOL, FOAM RECTAL EVERY 4 HOURS
Refills: 0 | Status: DISCONTINUED | OUTPATIENT
Start: 2023-09-24 | End: 2023-09-25

## 2023-09-24 RX ORDER — SODIUM CHLORIDE 9 MG/ML
3 INJECTION INTRAMUSCULAR; INTRAVENOUS; SUBCUTANEOUS EVERY 8 HOURS
Refills: 0 | Status: DISCONTINUED | OUTPATIENT
Start: 2023-09-24 | End: 2023-09-25

## 2023-09-24 RX ADMIN — Medication 1000 MILLIUNIT(S)/MIN: at 10:58

## 2023-09-24 RX ADMIN — Medication 600 MILLIGRAM(S): at 23:48

## 2023-09-24 RX ADMIN — Medication 975 MILLIGRAM(S): at 15:23

## 2023-09-24 RX ADMIN — Medication 975 MILLIGRAM(S): at 20:59

## 2023-09-24 RX ADMIN — Medication 975 MILLIGRAM(S): at 16:50

## 2023-09-24 RX ADMIN — SODIUM CHLORIDE 3 MILLILITER(S): 9 INJECTION INTRAMUSCULAR; INTRAVENOUS; SUBCUTANEOUS at 23:00

## 2023-09-24 RX ADMIN — Medication 600 MILLIGRAM(S): at 17:24

## 2023-09-24 RX ADMIN — Medication 975 MILLIGRAM(S): at 21:45

## 2023-09-24 RX ADMIN — Medication 600 MILLIGRAM(S): at 18:15

## 2023-09-24 RX ADMIN — Medication 975 MILLIGRAM(S): at 10:58

## 2023-09-24 NOTE — OB RN DELIVERY SUMMARY - NSSELHIDDEN_OBGYN_ALL_OB_FT
[NS_DeliveryAttending1_OBGYN_ALL_OB_FT:[id number]],[NS_DeliveryAssist1_OBGYN_ALL_OB_FT:[id number]=],[NS_DeliveryRN_OBGYN_ALL_OB_FT:[id number]]

## 2023-09-24 NOTE — OB PROVIDER DELIVERY SUMMARY - NSPROVIDERDELIVERYNOTE_OBGYN_ALL_OB_FT
Spontaneous vaginal delivery of liveborn infant from OA position. Head, shoulders, and body delivered easily. Infant passed to mother. Cord was clamped and cut. Placenta delivered spontaneously, noted to be intact. Fundal massage was given and uterine fundus was found to be firm. Vaginal exam revealed intact cervix, sulci, vaginal walls. Posterior vaginal mucosa with first degree laceration, repaired in standard fashion with chromic suture. Excellent hemostasis was noted. Patient stable. Count correct x 2.

## 2023-09-24 NOTE — OB PROVIDER DELIVERY SUMMARY - NSSELHIDDEN_OBGYN_ALL_OB_FT
[NS_DeliveryAttending1_OBGYN_ALL_OB_FT:[id number]=],[NS_DeliveryAssist1_OBGYN_ALL_OB_FT:[id number]=]

## 2023-09-24 NOTE — DISCHARGE NOTE OB - CARE PROVIDER_API CALL
Stef López  Obstetrics and Gynecology  [address] [place] [address]  Phone: [phone]  Fax: [phone]  Follow Up Time:

## 2023-09-24 NOTE — OB PROVIDER LABOR PROGRESS NOTE - ASSESSMENT
31y/o  at 40+4wks rrIOL.     Plan  -Vaginal cytotec placed  -continue EFM/TOCO/IV fluids  -consider pitocin when next due for induction agent  -repositioning    Discussed w/ Dr Jaleel ENRIQUEZM
- Continue induction   - Continue to monitor     Discussed with Dr. Fahad Murillo, PGY-1
P0 eIOL, patient stable with cat 1 tracing making cervical change  - high fowlers, peanut ball; patient to push when pressure increases  - anticipate     D/w Dr. Ozzy Espinoza PGY2
31 y/o  @40+4w rrIOL sp CB/PO    vaginal cytotec #1 placed  no membranes felt on exam possible SROM at time of cb falling out ~2a  for rpt CBC -> epidural   next VE re-evaluate for switch to pitocin  DW Dr Jaleel huston, NP

## 2023-09-24 NOTE — DISCHARGE NOTE OB - PATIENT PORTAL LINK FT
You can access the FollowMyHealth Patient Portal offered by [facility] by registering at the following website: [email or web address]. By joining [facility]’s FollowMyHealth portal, you will also be able to view your health information using other applications (apps) compatible with our system.

## 2023-09-24 NOTE — OB NEONATOLOGY/PEDIATRICIAN DELIVERY SUMMARY - NSPEDSNEONOTESA_OBGYN_ALL_OB_FT
Peds called to LDR for cat II tracing. 40.5 wk AGA male born via  to a 33 y/o  mother. No significant maternal or prenatal history. Maternal labs include Blood Type B+, HIV - , RPR NR , Rubella I , Hep B - , GBS - (). SROM at 0213 on  with clear fluids (ROM hours: 29H 2M).  Baby emerged vigorous, crying, was w/d/s/s with APGARS of 9/9. Resuscitation included: tactile stimulation and bulb suction. Mom plans to initiate breastfeeding, consents Hep B vaccine and declines circ.  Highest maternal temp: 37.2C. EOS 0.33    :   TOB: 0715  Weight: 3300 g    Physical Exam:  Gen: no acute distress, +grimace  HEENT:  anterior fontanel open soft and flat, nondysmorphic facies, no cleft lip/palate, ears normal set, no ear pits or tags, nares clinically patent  Resp: Normal respiratory effort without grunting or retractions, good air entry b/l, clear to auscultation bilaterally  Cardio: Present S1/S2, regular rate and rhythm, no murmurs  Abd: soft, non tender, non distended, umbilical cord with 3 vessels  Neuro: +palmar and plantar grasp, +suck, +noemi, normal tone  Extremities: negative flynn and ortolani maneuvers, moving all extremities, no clavicular crepitus or stepoff  Skin: pink, warm  Genitals: Normal male anatomy, testicles palpable in scrotum b/l, Maxx 1, anus patent

## 2023-09-24 NOTE — OB RN DELIVERY SUMMARY - NS_SEPSISRSKCALC_OBGYN_ALL_OB_FT
EOS calculated successfully. EOS Risk Factor: 0.5/1000 live births ([facility] national incidence); GA=40w5d; Temp=98.96; ROM=29.033; GBS='Negative'; Antibiotics='No antibiotics or any antibiotics < 2 hrs prior to birth'

## 2023-09-24 NOTE — DISCHARGE NOTE OB - MATERIALS PROVIDED
[facility] [place] Screening Program/[place]  Immunization Record/Breastfeeding Log/Guide to Postpartum Care/[facility] Hearing Screen Program/Shaken Baby Prevention Handout/Birth Certificate Instructions

## 2023-09-24 NOTE — OB PROVIDER LABOR PROGRESS NOTE - NS_SUBJECTIVE/OBJECTIVE_OBGYN_ALL_OB_FT
Patient seen for increased rectal pressure with contractions
Pt seen for cercivcal exam  plts on admission 129 no previous hx of abnormal plt count, abnormal bleeding, elevated bp
Pt seen & examined at bedside for labor progress and vaginal cytotec placement. Resting comfortably with epidural.     Vital Signs Last 24 Hrs  T(C): 36.8 (23 Sep 2023 12:00), Max: 36.9 (23 Sep 2023 06:09)  T(F): 98.24 (23 Sep 2023 12:00), Max: 98.4 (23 Sep 2023 06:09)  HR: 77 (23 Sep 2023 12:56) (59 - 84)  BP: 107/64 (23 Sep 2023 12:55) (102/67 - 130/63)  BP(mean): --  RR: 18 (23 Sep 2023 06:09) (16 - 18)  SpO2: 100% (23 Sep 2023 12:56) (97% - 100%)    Parameters below as of 23 Sep 2023 06:09  Patient On (Oxygen Delivery Method): room air         , moderate variability, +accels, -decels, cat 1   TOCO: irregular  VE: 4/70/-3, medium/soft tone
R1 Progress Note    Patient seen and examined at bedside s/p cervical balloon.     NAD  Vital Signs Last 24 Hrs  T(C): 36.6 (23 Sep 2023 02:37), Max: 36.8 (22 Sep 2023 13:53)  T(F): 97.8 (23 Sep 2023 02:37), Max: 98.2 (22 Sep 2023 13:53)  HR: 74 (23 Sep 2023 02:37) (69 - 83)  BP: 102/67 (23 Sep 2023 02:37) (102/67 - 112/63)  BP(mean): --  RR: 17 (23 Sep 2023 02:37) (16 - 17)  SpO2: 100% (23 Sep 2023 02:37) (97% - 100%)    Parameters below as of 23 Sep 2023 02:37  Patient On (Oxygen Delivery Method): room air

## 2023-09-25 VITALS
OXYGEN SATURATION: 99 % | TEMPERATURE: 98 F | SYSTOLIC BLOOD PRESSURE: 110 MMHG | RESPIRATION RATE: 16 BRPM | HEART RATE: 72 BPM | DIASTOLIC BLOOD PRESSURE: 68 MMHG

## 2023-09-25 RX ADMIN — Medication 975 MILLIGRAM(S): at 03:40

## 2023-09-25 RX ADMIN — Medication 600 MILLIGRAM(S): at 06:42

## 2023-09-25 RX ADMIN — Medication 600 MILLIGRAM(S): at 00:40

## 2023-09-25 RX ADMIN — Medication 975 MILLIGRAM(S): at 03:00

## 2023-09-25 RX ADMIN — Medication 975 MILLIGRAM(S): at 09:41

## 2023-09-25 RX ADMIN — Medication 600 MILLIGRAM(S): at 05:57

## 2023-09-25 RX ADMIN — SODIUM CHLORIDE 3 MILLILITER(S): 9 INJECTION INTRAMUSCULAR; INTRAVENOUS; SUBCUTANEOUS at 05:54

## 2023-09-25 RX ADMIN — Medication 975 MILLIGRAM(S): at 10:15

## 2023-10-08 NOTE — ED PROVIDER NOTE - SKIN, MLM
Addended by: GERRI ADORNO on: 10/8/2023 03:24 PM     Modules accepted: Orders     Skin normal color for race, warm, dry and intact. No evidence of rash.

## 2025-06-25 ENCOUNTER — APPOINTMENT (OUTPATIENT)
Dept: DERMATOLOGY | Facility: CLINIC | Age: 35
End: 2025-06-25
Payer: COMMERCIAL

## 2025-06-25 PROCEDURE — 99214 OFFICE O/P EST MOD 30 MIN: CPT

## 2025-06-25 RX ORDER — TACROLIMUS 1 MG/G
0.1 OINTMENT TOPICAL
Qty: 1 | Refills: 3 | Status: ACTIVE | COMMUNITY
Start: 2025-06-25 | End: 1900-01-01